# Patient Record
Sex: FEMALE | Race: WHITE | Employment: FULL TIME | URBAN - METROPOLITAN AREA
[De-identification: names, ages, dates, MRNs, and addresses within clinical notes are randomized per-mention and may not be internally consistent; named-entity substitution may affect disease eponyms.]

---

## 2017-02-06 RX ORDER — TRAZODONE HYDROCHLORIDE 100 MG/1
100 TABLET ORAL
Qty: 30 TAB | Refills: 3 | Status: SHIPPED | OUTPATIENT
Start: 2017-02-06 | End: 2017-06-23 | Stop reason: SDUPTHER

## 2017-02-17 ENCOUNTER — OFFICE VISIT (OUTPATIENT)
Dept: FAMILY MEDICINE CLINIC | Age: 52
End: 2017-02-17

## 2017-02-17 VITALS
WEIGHT: 187.2 LBS | HEART RATE: 72 BPM | RESPIRATION RATE: 16 BRPM | BODY MASS INDEX: 29.38 KG/M2 | DIASTOLIC BLOOD PRESSURE: 88 MMHG | SYSTOLIC BLOOD PRESSURE: 118 MMHG | HEIGHT: 67 IN | TEMPERATURE: 97.9 F | OXYGEN SATURATION: 98 %

## 2017-02-17 DIAGNOSIS — H81.10 BPPV (BENIGN PAROXYSMAL POSITIONAL VERTIGO), UNSPECIFIED LATERALITY: ICD-10-CM

## 2017-02-17 DIAGNOSIS — J30.1 NON-SEASONAL ALLERGIC RHINITIS DUE TO POLLEN: ICD-10-CM

## 2017-02-17 DIAGNOSIS — E53.8 VITAMIN B 12 DEFICIENCY: Primary | ICD-10-CM

## 2017-02-17 DIAGNOSIS — R79.89 ABNORMAL TSH: ICD-10-CM

## 2017-02-17 RX ORDER — MONTELUKAST SODIUM 10 MG/1
10 TABLET ORAL
Qty: 30 TAB | Refills: 3 | Status: SHIPPED | OUTPATIENT
Start: 2017-02-17 | End: 2019-05-08

## 2017-02-17 NOTE — PROGRESS NOTES
1. Have you been to the ER, urgent care clinic since your last visit? Hospitalized since your last visit? NO    2. Have you seen or consulted any other health care providers outside of the [address] [place] [address] since your last visit? Include any pap smears or colon screening. NO    3. Would you like to have a Flu Shot Today? NO    4. Vaccines?  No

## 2017-02-17 NOTE — MR AVS SNAPSHOT
Visit Information Date & Time Provider Department Dept. Phone Encounter #  
 2/17/2017  8:40 AM Maria Luz Whitney, [address]  [id number] Follow-up Instructions Return in about 4 weeks (around 3/17/2017), or if symptoms worsen or fail to improve, for follow up allergic rhinitis perrenial and BBPV. Upcoming Health Maintenance Date Due Hepatitis C Screening 1965 DTaP/Tdap/Td series (1 - Tdap) 5/29/1984 PAP AKA CERVICAL CYTOLOGY 5/29/1984 FOBT Q 1 YEAR AGE 50-75 5/29/2013 BREAST CANCER SCRN MAMMOGRAM 3/5/2018 Allergies as of 2/17/2017  Review Complete On: 2/17/2017 By: Maria Luz Whitney NP Severity Noted Reaction Type Reactions Gabapentin  12/01/2016    Other (comments) Fatigued, anhedonia Morphine  01/29/2016    Hives, Itching Current Immunizations  Never Reviewed No immunizations on file. Not reviewed this visit You Were Diagnosed With   
  
 Codes Comments Vitamin B 12 deficiency    -  Primary ICD-10-CM: E53.8 ICD-9-CM: 266.2 Abnormal TSH     ICD-10-CM: R79.89 ICD-9-CM: 790.6 BPPV (benign paroxysmal positional vertigo), unspecified laterality     ICD-10-CM: H81.10 ICD-9-CM: 386.11 Non-seasonal allergic rhinitis due to pollen     ICD-10-CM: J30.1 ICD-9-CM: 477.0 Vitals BP Pulse Temp Resp Height(growth percentile) Weight(growth percentile) 118/88 (BP 1 Location: Left arm, BP Patient Position: Sitting) 72 97.9 °F (36.6 °C) (Oral) 16 5' 7\" (1.702 m) 187 lb 3.2 oz (84.9 kg) SpO2 BMI OB Status Smoking Status 98% 29.32 kg/m2 Postmenopausal Former Smoker BMI and BSA Data Body Mass Index Body Surface Area  
 29.32 kg/m 2 2 m 2 Preferred Pharmacy Pharmacy Name Phone [address], [address] [place] [address] [phone] Your Updated Medication List  
  
   
 This list is accurate as of: 2/17/17  9:31 AM.  Always use your most recent med list.  
  
  
  
  
 cyclobenzaprine 5 mg tablet Commonly known as:  FLEXERIL Take 1 Tab by mouth three (3) times daily (with meals). Indications: FIBROMYALGIA DULoxetine 30 mg capsule Commonly known as:  CYMBALTA 1 tab daily x1 week then 1 tab BID  
  
 montelukast 10 mg tablet Commonly known as:  SINGULAIR Take 1 Tab by mouth nightly. Indications: ALLERGIC RHINITIS  
  
 oxybutynin chloride XL 15 mg CR tablet Commonly known as:  DITROPAN XL Take 1 Tab by mouth daily. traZODone 100 mg tablet Commonly known as:  Oletta Lurdes Take 1 Tab by mouth nightly. Prescriptions Sent to Pharmacy Refills  
 montelukast (SINGULAIR) 10 mg tablet 3 Sig: Take 1 Tab by mouth nightly. Indications: ALLERGIC RHINITIS Class: Normal  
 Pharmacy: [facility] #: [phone] Route: Oral  
  
Follow-up Instructions Return in about 4 weeks (around 3/17/2017), or if symptoms worsen or fail to improve, for follow up allergic rhinitis perrenial and BBPV. To-Do List   
 02/17/2017 Lab:  T4, FREE   
  
 02/17/2017 Lab:  TSH 3RD GENERATION   
  
 02/17/2017 Lab:  VITAMIN B12 Patient Instructions Allergies: Care Instructions Your Care Instructions Allergies occur when your body's defense system (immune system) overreacts to certain substances. The immune system treats a harmless substance as if it were a harmful germ or virus. Many things can cause this overreaction, including pollens, medicine, food, dust, animal dander, and mold. Allergies can be mild or severe. Mild allergies can be managed with home treatment. But medicine may be needed to prevent problems. Managing your allergies is an important part of staying healthy.  Your doctor may suggest that you have allergy testing to help find out what is causing your allergies. When you know what things trigger your symptoms, you can avoid them. This can prevent allergy symptoms and other health problems. For severe allergies that cause reactions that affect your whole body (anaphylactic reactions), your doctor may prescribe a shot of epinephrine to carry with you in case you have a severe reaction. Learn how to give yourself the shot and keep it with you at all times. Make sure it is not . Follow-up care is a key part of your treatment and safety. Be sure to make and go to all appointments, and call your doctor if you are having problems. It's also a good idea to know your test results and keep a list of the medicines you take. How can you care for yourself at home? · If you have been told by your doctor that dust or dust mites are causing your allergy, decrease the dust around your bed: 
[facility] sheets, pillowcases, and other bedding in hot water every week. ¨ Use dust-proof covers for pillows, duvets, and mattresses. Avoid plastic covers because they tear easily and do not \"breathe. \" Wash as instructed on the label. ¨ Do not use any blankets and pillows that you do not need. ¨ Use blankets that you can wash in your washing machine. ¨ Consider removing drapes and carpets, which attract and hold dust, from your bedroom. · If you are allergic to house dust and mites, do not use home humidifiers. Your doctor can suggest ways you can control dust and mites. · Look for signs of cockroaches. Cockroaches cause allergic reactions. Use cockroach baits to get rid of them. Then, clean your home well. Cockroaches like areas where grocery bags, newspapers, empty bottles, or cardboard boxes are stored. Do not keep these inside your home, and keep trash and food containers sealed. Seal off any spots where cockroaches might enter your home. · If you are allergic to mold, get rid of furniture, rugs, and drapes that smell musty. Check for mold in the bathroom. · If you are allergic to outdoor pollen or mold spores, use air-conditioning. Change or clean all filters every month. Keep windows closed. · If you are allergic to pollen, stay inside when pollen counts are high. Use a vacuum  with a HEPA filter or a double-thickness filter at least two times each week. · Stay inside when air pollution is bad. Avoid paint fumes, perfumes, and other strong odors. · Avoid conditions that make your allergies worse. Stay away from smoke. Do not smoke or let anyone else smoke in your house. Do not use fireplaces or wood-burning stoves. · If you are allergic to your pets, change the air filter in your furnace every month. Use high-efficiency filters. · If you are allergic to pet dander, keep pets outside or out of your bedroom. Old carpet and cloth furniture can hold a lot of animal dander. You may need to replace them. When should you call for help? Give an epinephrine shot if: 
· You think you are having a severe allergic reaction. · You have symptoms in more than one body area, such as mild nausea and an itchy mouth. After giving an epinephrine shot call 911, even if you feel better. Call 911 if: 
· You have symptoms of a severe allergic reaction. These may include: 
¨ Sudden raised, red areas (hives) all over your body. ¨ Swelling of the throat, mouth, lips, or tongue. ¨ Trouble breathing. ¨ Passing out (losing consciousness). Or you may feel very lightheaded or suddenly feel weak, confused, or restless. · You have been given an epinephrine shot, even if you feel better. Call your doctor now or seek immediate medical care if: 
· You have symptoms of an allergic reaction, such as: ¨ A rash or hives (raised, red areas on the skin). ¨ Itching. ¨ Swelling. ¨ Belly pain, nausea, or vomiting. Watch closely for changes in your health, and be sure to contact your doctor if: 
· You do not get better as expected. Where can you learn more? Go to http://yuliya-philly.info/. Enter O140 in the search box to learn more about \"Allergies: Care Instructions. \" Current as of: February 12, 2016 Content Version: 11.1 © 4860-0959 [employer]. Care instructions adapted under license by [employer] (which disclaims liability or warranty for this information). If you have questions about a medical condition or this instruction, always ask your healthcare professional. [facility] any warranty or liability for your use of this information. Montelukast (By mouth) Montelukast (odx-ky-QEU-kast) Prevents asthma attacks and treats allergies. Brand Name(s):Singulair There may be other brand names for this medicine. When This Medicine Should Not Be Used: This medicine is not right for everyone. Do not use it if you had an allergic reaction to montelukast. 
How to Use This Medicine:  
Packet, Tablet, Chewable Tablet · Your doctor will tell you how much medicine to use. Do not use more than directed. · Read and follow the patient instructions that come with this medicine. Talk to your doctor or pharmacist if you have any questions. · Oral granules: Do not open the packet until you are ready to use it. You can give the oral granules in one of three ways. ¨ Put the oral granules directly on a spoon, then into the person's mouth. ¨ Mix the granules with baby formula or breast milk that is cold or room temperature. Do not mix the granules with any other liquid. ¨ Mix the granules with applesauce, mashed carrots, rice, or ice cream. Do not mix the granules with any other type of soft food. ¨ If the medicine is mixed with formula, breast milk, or food, use it right away. Do not save any mixed medicine to use later. · Missed dose: Take a dose as soon as you remember. If it is almost time for your next dose, wait until then and take a regular dose.  Do not take extra medicine to make up for a missed dose. · Store the medicine in the original container at room temperature, away from heat and direct light. Drugs and Foods to Avoid: Ask your doctor or pharmacist before using any other medicine, including over-the-counter medicines, vitamins, and herbal products. Warnings While Using This Medicine: · Tell your doctor if you are pregnant or breastfeeding, or if you have liver disease, have a condition called phenylketonuria (PKU), or are allergic to aspirin. · This medicine will not stop an asthma attack that has already started. Your doctor may prescribe another medicine for a sudden asthma attack. · If you use a corticosteroid medicine to control your asthma, keep using it as instructed by your doctor. · If any of your asthma medicines do not seem to be working as well as usual, call your doctor right away. Do not change your doses or stop using your medicines without asking your doctor. · This medicine may cause some people to be agitated, disoriented, irritable, or reckless. It may also cause depression or suicidal thoughts. Tell your doctor if you have any unusual thoughts or behaviors that trouble you. · This medicine may make you dizzy or drowsy. Do not drive or do anything else that could be dangerous until you know how this medicine affects you. · Keep all medicine out of the reach of children. Never share your medicine with anyone. Possible Side Effects While Using This Medicine:  
Call your doctor right away if you notice any of these side effects: · Allergic reaction: Itching or hives, swelling in your face or hands, swelling or tingling in your mouth or throat, chest tightness, trouble breathing · Blistering, peeling, red skin rash · Chest pain, or a fast, pounding, or uneven heartbeat · Numbness or tingling in the hands, arms, legs, or feet · Pain and swelling in your sinuses · Restlessness, anxiety, irritability, mood or behavior changes, or thoughts of hurting yourself or others · Sudden and severe stomach pain, nausea, vomiting, fever, lightheadedness · Unusual bleeding or bruising If you notice these less serious side effects, talk with your doctor: · Cough, sore throat, or flu symptoms · Headache If you notice other side effects that you think are caused by this medicine, tell your doctor. Call your doctor for medical advice about side effects. You may report side effects to FDA at 3-531-FBX-8466 © 2016 [address] is for End User's use only and may not be sold, redistributed or otherwise used for commercial purposes. The above information is an  only. It is not intended as medical advice for individual conditions or treatments. Talk to your doctor, nurse or pharmacist before following any medical regimen to see if it is safe and effective for you. Learning About Leukotriene Modifiers Introduction Leukotriene (say \"loo-koh-TRY-een\") modifiers are drugs used for asthma and hay fever. They treat asthma symptoms in adults. And in children, they improve how well the lungs work. They are not used to treat asthma attacks. They also reduce symptoms of hay fever. These include sneezing and a runny or stuffy nose. Examples · Montelukast (Singulair) · Zafirlukast (Accolate) · Zileuton (Zyflo) Side effects · Vomiting. · Diarrhea. · A headache. You may have other side effects or reactions. Check the information that comes with your medicine. What to know about taking these medicines · These drugs may help if your asthma is caused by exercise, aspirin, or allergies. · You may need to have blood tests during the first 6 months of treatment. The tests check for liver damage. · Take your medicines exactly as prescribed. Call your doctor if you think you are having a problem with your medicine. · Check with your doctor or pharmacist before you use any other medicines. This includes over-the-counter drugs. Make sure your doctor knows all of the medicines you take. This includes vitamins, herbs, and supplements. Taking some drugs together can cause problems. Where can you learn more? Go to http://yuliya-philly.info/. Enter Q022 in the search box to learn more about \"Learning About Leukotriene Modifiers. \" Current as of: May 23, 2016 Content Version: 11.1 © 7332-4459 [employer]APE. Care instructions adapted under license by [employer] (which disclaims liability or warranty for this information). If you have questions about a medical condition or this instruction, always ask your healthcare professional. [address] any warranty or liability for your use of this information. Benign Paroxysmal Positional Vertigo (BPPV): Care Instructions Your Care Instructions Benign paroxysmal positional vertigo, also called BPPV, is an inner ear problem. It causes a spinning or whirling sensation when you move your head. This sensation is called vertigo. The vertigo usually lasts for less than a minute. People often have vertigo spells for a few days or weeks. Then the vertigo goes away. But it may come back again. The vertigo may be mild, or it may be bad enough to cause unsteadiness, nausea, and vomiting. When you move, your inner ear sends messages to the brain. This helps you keep your balance. Vertigo can happen when debris builds up in the inner ear. The buildup can cause the inner ear to send the wrong message to the brain. Your doctor may move you in different positions to help your vertigo get better faster. This is called the Epley maneuver. Your doctor may also prescribe medicines or exercises to help with your symptoms. Follow-up care is a key part of your treatment and safety.  Be sure to make and go to all appointments, and call your doctor if you are having problems. It's also a good idea to know your test results and keep a list of the medicines you take. How can you care for yourself at home? · If your doctor suggests that you do Courtney-Daroff exercises: ¨ Sit on the edge of a bed or sofa. Quickly lie down on the side that causes the worst vertigo. Lie on your side with your ear down. ¨ Stay in this position for at least 30 seconds or until the vertigo goes away. ¨ Sit up. If this causes vertigo, wait for it to stop. ¨ Repeat the procedure on the other side. ¨ Repeat this 10 times. Do these exercises 2 times a day until the vertigo is gone. When should you call for help? Call 911 anytime you think you may need emergency care. For example, call if: 
· You have symptoms of a stroke. These may include: 
¨ Sudden numbness, tingling, weakness, or loss of movement in your face, arm, or leg, especially on only one side of your body. ¨ Sudden vision changes. ¨ Sudden trouble speaking. ¨ Sudden confusion or trouble understanding simple statements. ¨ Sudden problems with walking or balance. ¨ A sudden, severe headache that is different from past headaches. Call your doctor now or seek immediate medical care if: 
· You have new or worse nausea and vomiting. · You have new symptoms such as hearing loss or roaring in your ears. Watch closely for changes in your health, and be sure to contact your doctor if: 
· You are not getting better as expected. · Your vertigo gets worse. Where can you learn more? Go to http://yuliya-philly.info/. Enter  in the search box to learn more about \"Benign Paroxysmal Positional Vertigo (BPPV): Care Instructions. \" Current as of: July 29, 2016 Content Version: 11.1 © 7937-2591 [employer].  Care instructions adapted under license by [employer] (which disclaims liability or warranty for this information). If you have questions about a medical condition or this instruction, always ask your healthcare professional. [address] any warranty or liability for your use of this information. [place]orne Exercises for Vertigo: Care Instructions Your Care Instructions Simple exercises can help you regain your balance when you have vertigo. If you have Ménière's disease, benign paroxysmal positional vertigo (BPPV), or another inner ear problem, you may have vertigo off and on. Do these exercises first thing in the morning and before you go to bed. You might get dizzy when you first start them. If this happens, try to do them for at least 5 minutes. Do a group of exercises at a time, starting at the top of the list. It may take several weeks before you can do all the exercises without feeling dizzy. Follow-up care is a key part of your treatment and safety. Be sure to make and go to all appointments, and call your doctor if you are having problems. It's also a good idea to know your test results and keep a list of the medicines you take. How can you care for yourself at home? Exercise 1 While sitting on the side of the bed and holding your head still: 
· Look up as far as you can. · Look down as far as you can. · Look from side to side as far as you can. · Stretch your arm straight out in front of you. Focus on your index finger. Continue to focus on your finger while you bring it to your nose. Exercise 2 While sitting on the side of the bed: · Bring your head as far back as you can. · Bring your head forward to touch your chin to your chest. 
· Turn your head from side to side. · Do these exercises first with your eyes open. Then try with your eyes closed. Exercise 3 While sitting on the side of the bed: · Shrug your shoulders straight upward, then relax them. · Bend over and try to touch the ground with your fingers. Then go back to a sitting position. · Toss a small ball from one hand to the other. Throw the ball higher than your eyes so you have to look up. Exercise 4 While standing (with someone close by if you feel uncomfortable): 
· Repeat Exercise 1. 
· Repeat Exercise 2. 
· Pass a ball between your legs and above your head. · Sit down and then stand up. Repeat. Turn around in a [place] a different way each time you stand. · With someone close by to help you, try the above exercises with your eyes closed. Exercise 5 In a room that is cleared of obstacles: 
· Walk to a corner of the room, turn to your right, and walk back to the starting point. Now, repeat and turn left. · Walk up and down a slope. Now try stairs. · While holding on to someone's arm, try these exercises with your eyes closed. When should you call for help? Watch closely for changes in your health, and be sure to contact your doctor if: 
· Your vertigo gets worse. · You want more information about vertigo. · You want more information about exercises for vertigo. Where can you learn more? Go to http://yuliya-philly.info/. Enter C687 in the search box to learn more about \"Cawthorne Exercises for Vertigo: Care Instructions. \" Current as of: July 29, 2016 Content Version: 11.1 © 0335-8471 [employer]wise[employer]orporated. Care instructions adapted under license by [employer] (which disclaims liability or warranty for this information). If you have questions about a medical condition or this instruction, always ask your healthcare professional. [place] [address] any warranty or liability for your use of this information. Introducing [facility]! [address] introduces [email or web address] patient portal. Now you can access parts of your medical record, email your doctor's office, and request medication refills online. 1. In your internet browser, go to [email or web address] 2. Click on the First Time User? Click Here link in the Sign In box. You will see the New Member Sign Up page. 3. Enter your [employer] Access Code exactly as it appears below. You will not need to use this code after youve completed the sign-up process. If you do not sign up before the expiration date, you must request a new code. · [employer] Access Code: [email or web address] Expires: 5/18/2017  9:31 AM 
 
4. Enter the last four digits of your Social Security Number (xxxx) and Date of Birth (mm/dd/yyyy) as indicated and click Submit. You will be taken to the next sign-up page. 5. Create a E[employer] ID. This will be your [employer] login ID and cannot be changed, so think of one that is secure and easy to remember. 6. Create a E[employer] password. You can change your password at any time. 7. Enter your Password Reset Question and Answer. This can be used at a later time if you forget your password. 8. Enter your e-mail address. You will receive e-mail notification when new information is available in [address] 9. Click Sign Up. You can now view and download portions of your medical record. 10. Click the Download Summary menu link to download a portable copy of your medical information. If you have questions, please visit the Frequently Asked Questions section of the E[employer]town website. Remember, [employer] is NOT to be used for urgent needs. For medical emergencies, dial 911. Now available from your iPhone and Android! Please provide this summary of care documentation to your next provider. Your primary care clinician is listed as [address]. If you have any questions after today's visit, please call [phone].

## 2017-02-17 NOTE — PROGRESS NOTES
Subjective:   Evens Glez is a 46 y.o. female follows up today for vitamin b 12 low normal.  Has been taking vitamin b 12 1000 mcg daily for past one month. Reviewed labs with patient. She is also complaining of intermittent lightheadedness, headaches 3-4 times per week across the frontal and maxillary, +sinus issues with weather changes. She reports that she awakens with these headaches. ROS: denies migraines, +use nettipot, denies use of antihistamines, denies tingling in hands and feet, denies heart palpitations, denies falls, denies syncope, denies ear congestion, tinnitus. Wt Readings from Last 3 Encounters:   02/17/17 187 lb 3.2 oz (84.9 kg)   12/01/16 189 lb 9.6 oz (86 kg)   11/03/16 189 lb 3.2 oz (85.8 kg)     BP Readings from Last 3 Encounters:   02/17/17 118/88   12/01/16 110/84   11/03/16 118/80     OBJECTIVE:  Awake and alert in no acute distress  HEENT: nares patent with erythema, boggy, clear secretions bilaterally. TMs retracted bilaterally, pharynx without erythema, neck supple with shotty lymphadenopathy. No tenderness to palpation over sinus passages bilaterally  Lungs clear throughout  S1 S2 RRR without ectopy or murmur auscultated. Neuro: cranial nerves II-XII tested and intact. No gait abnormalities. +barany procedure  Visit Vitals    /88 (BP 1 Location: Left arm, BP Patient Position: Sitting)    Pulse 72    Temp 97.9 °F (36.6 °C) (Oral)    Resp 16    Ht 5' 7\" (1.702 m)    Wt 187 lb 3.2 oz (84.9 kg)    SpO2 98%    BMI 29.32 kg/m2     [facility] was seen today for other. Diagnoses and all orders for this visit:    Vitamin B 12 deficiency  -     VITAMIN B12; Future    Abnormal TSH  -     TSH 3RD GENERATION; Future  -     T4, FREE; Future    BPPV (benign paroxysmal positional vertigo), unspecified laterality    Non-seasonal allergic rhinitis due to pollen  -     montelukast (SINGULAIR) 10 mg tablet; Take 1 Tab by mouth nightly.  Indications: ALLERGIC RHINITIS    Reviewed risks and benefits and common side effects of new medication  General comfort measures  Reviewed exercises for BPPV  Health maintenance reviewed  Portion control and exercise healthy eating general guidelines reviewed with patient to get closer to normal BMI  Patient verbalizes understanding. I have discussed the diagnosis with the patient and the intended plan as seen in the above orders. The patient has received an after-visit summary and questions were answered concerning future plans. I have discussed medication side effects and warnings with the patient as well. Follow-up Disposition:  Return in about 4 weeks (around 3/17/2017), or if symptoms worsen or fail to improve, for follow up allergic rhinitis perrenial and BBPV.

## 2017-02-17 NOTE — PATIENT INSTRUCTIONS
Allergies: Care Instructions  Your Care Instructions  Allergies occur when your body's defense system (immune system) overreacts to certain substances. The immune system treats a harmless substance as if it were a harmful germ or virus. Many things can cause this overreaction, including pollens, medicine, food, dust, animal dander, and mold. Allergies can be mild or severe. Mild allergies can be managed with home treatment. But medicine may be needed to prevent problems. Managing your allergies is an important part of staying healthy. Your doctor may suggest that you have allergy testing to help find out what is causing your allergies. When you know what things trigger your symptoms, you can avoid them. This can prevent allergy symptoms and other health problems. For severe allergies that cause reactions that affect your whole body (anaphylactic reactions), your doctor may prescribe a shot of epinephrine to carry with you in case you have a severe reaction. Learn how to give yourself the shot and keep it with you at all times. Make sure it is not . Follow-up care is a key part of your treatment and safety. Be sure to make and go to all appointments, and call your doctor if you are having problems. It's also a good idea to know your test results and keep a list of the medicines you take. How can you care for yourself at home? · If you have been told by your doctor that dust or dust mites are causing your allergy, decrease the dust around your bed:  [facility] sheets, pillowcases, and other bedding in hot water every week. ¨ Use dust-proof covers for pillows, duvets, and mattresses. Avoid plastic covers because they tear easily and do not \"breathe. \" Wash as instructed on the label. ¨ Do not use any blankets and pillows that you do not need. ¨ Use blankets that you can wash in your washing machine. ¨ Consider removing drapes and carpets, which attract and hold dust, from your bedroom.   · If you are allergic to house dust and mites, do not use home humidifiers. Your doctor can suggest ways you can control dust and mites. · Look for signs of cockroaches. Cockroaches cause allergic reactions. Use cockroach baits to get rid of them. Then, clean your home well. Cockroaches like areas where grocery bags, newspapers, empty bottles, or cardboard boxes are stored. Do not keep these inside your home, and keep trash and food containers sealed. Seal off any spots where cockroaches might enter your home. · If you are allergic to mold, get rid of furniture, rugs, and drapes that smell musty. Check for mold in the bathroom. · If you are allergic to outdoor pollen or mold spores, use air-conditioning. Change or clean all filters every month. Keep windows closed. · If you are allergic to pollen, stay inside when pollen counts are high. Use a vacuum  with a HEPA filter or a double-thickness filter at least two times each week. · Stay inside when air pollution is bad. Avoid paint fumes, perfumes, and other strong odors. · Avoid conditions that make your allergies worse. Stay away from smoke. Do not smoke or let anyone else smoke in your house. Do not use fireplaces or wood-burning stoves. · If you are allergic to your pets, change the air filter in your furnace every month. Use high-efficiency filters. · If you are allergic to pet dander, keep pets outside or out of your bedroom. Old carpet and cloth furniture can hold a lot of animal dander. You may need to replace them. When should you call for help? Give an epinephrine shot if:  · You think you are having a severe allergic reaction. · You have symptoms in more than one body area, such as mild nausea and an itchy mouth. After giving an epinephrine shot call 911, even if you feel better. Call 911 if:  · You have symptoms of a severe allergic reaction. These may include:  ¨ Sudden raised, red areas (hives) all over your body.   ¨ Swelling of the throat, mouth, lips, or tongue. ¨ Trouble breathing. ¨ Passing out (losing consciousness). Or you may feel very lightheaded or suddenly feel weak, confused, or restless. · You have been given an epinephrine shot, even if you feel better. Call your doctor now or seek immediate medical care if:  · You have symptoms of an allergic reaction, such as:  ¨ A rash or hives (raised, red areas on the skin). ¨ Itching. ¨ Swelling. ¨ Belly pain, nausea, or vomiting. Watch closely for changes in your health, and be sure to contact your doctor if:  · You do not get better as expected. Where can you learn more? Go to http://yuliya-philly.info/. Enter Y508 in the search box to learn more about \"Allergies: Care Instructions. \"  Current as of: February 12, 2016  Content Version: 11.1  © 1819-8145 [employer]. Care instructions adapted under license by [employer] (which disclaims liability or warranty for this information). If you have questions about a medical condition or this instruction, always ask your healthcare professional. [address] any warranty or liability for your use of this information. Montelukast (By mouth)   Montelukast ([id number])  Prevents asthma attacks and treats allergies. Brand Name(s):Singulair   There may be other brand names for this medicine. When This Medicine Should Not Be Used: This medicine is not right for everyone. Do not use it if you had an allergic reaction to montelukast.  How to Use This Medicine:   Packet, Tablet, Chewable Tablet  · Your doctor will tell you how much medicine to use. Do not use more than directed. · Read and follow the patient instructions that come with this medicine. Talk to your doctor or pharmacist if you have any questions. · Oral granules: Do not open the packet until you are ready to use it. You can give the oral granules in one of three ways.   ¨ Put the oral granules directly on a spoon, then into the person's mouth. ¨ Mix the granules with baby formula or breast milk that is cold or room temperature. Do not mix the granules with any other liquid. ¨ Mix the granules with applesauce, mashed carrots, rice, or ice cream. Do not mix the granules with any other type of soft food. ¨ If the medicine is mixed with formula, breast milk, or food, use it right away. Do not save any mixed medicine to use later. · Missed dose: Take a dose as soon as you remember. If it is almost time for your next dose, wait until then and take a regular dose. Do not take extra medicine to make up for a missed dose. · Store the medicine in the original container at room temperature, away from heat and direct light. Drugs and Foods to Avoid:      Ask your doctor or pharmacist before using any other medicine, including over-the-counter medicines, vitamins, and herbal products. Warnings While Using This Medicine:   · Tell your doctor if you are pregnant or breastfeeding, or if you have liver disease, have a condition called phenylketonuria (PKU), or are allergic to aspirin. · This medicine will not stop an asthma attack that has already started. Your doctor may prescribe another medicine for a sudden asthma attack. · If you use a corticosteroid medicine to control your asthma, keep using it as instructed by your doctor. · If any of your asthma medicines do not seem to be working as well as usual, call your doctor right away. Do not change your doses or stop using your medicines without asking your doctor. · This medicine may cause some people to be agitated, disoriented, irritable, or reckless. It may also cause depression or suicidal thoughts. Tell your doctor if you have any unusual thoughts or behaviors that trouble you. · This medicine may make you dizzy or drowsy. Do not drive or do anything else that could be dangerous until you know how this medicine affects you. · Keep all medicine out of the reach of children. Never share your medicine with anyone. Possible Side Effects While Using This Medicine:   Call your doctor right away if you notice any of these side effects:  · Allergic reaction: Itching or hives, swelling in your face or hands, swelling or tingling in your mouth or throat, chest tightness, trouble breathing  · Blistering, peeling, red skin rash  · Chest pain, or a fast, pounding, or uneven heartbeat  · Numbness or tingling in the hands, arms, legs, or feet  · Pain and swelling in your sinuses  · Restlessness, anxiety, irritability, mood or behavior changes, or thoughts of hurting yourself or others  · Sudden and severe stomach pain, nausea, vomiting, fever, lightheadedness  · Unusual bleeding or bruising  If you notice these less serious side effects, talk with your doctor:   · Cough, sore throat, or flu symptoms  · Headache  If you notice other side effects that you think are caused by this medicine, tell your doctor. Call your doctor for medical advice about side effects. You may report side effects to FDA at 5-981-FDA-1687  © 2016 [address] is for End User's use only and may not be sold, redistributed or otherwise used for commercial purposes. The above information is an  only. It is not intended as medical advice for individual conditions or treatments. Talk to your doctor, nurse or pharmacist before following any medical regimen to see if it is safe and effective for you. Learning About Leukotriene Modifiers  Introduction  Leukotriene (say \"loo-koh-TRY-een\") modifiers are drugs used for asthma and hay fever. They treat asthma symptoms in adults. And in children, they improve how well the lungs work. They are not used to treat asthma attacks. They also reduce symptoms of hay fever. These include sneezing and a runny or stuffy nose.   Examples  · Montelukast (Singulair)  · Zafirlukast (Accolate)  · Zileuton (Zyflo)  Side effects  · Vomiting. · Diarrhea. · A headache. You may have other side effects or reactions. Check the information that comes with your medicine. What to know about taking these medicines  · These drugs may help if your asthma is caused by exercise, aspirin, or allergies. · You may need to have blood tests during the first 6 months of treatment. The tests check for liver damage. · Take your medicines exactly as prescribed. Call your doctor if you think you are having a problem with your medicine. · Check with your doctor or pharmacist before you use any other medicines. This includes over-the-counter drugs. Make sure your doctor knows all of the medicines you take. This includes vitamins, herbs, and supplements. Taking some drugs together can cause problems. Where can you learn more? Go to http://yuliya-philly.info/. Enter Z101 in the search box to learn more about \"Learning About Leukotriene Modifiers. \"  Current as of: May 23, 2016  Content Version: 11.1  © 3429-8283 [employer]. Care instructions adapted under license by [employer] (which disclaims liability or warranty for this information). If you have questions about a medical condition or this instruction, always ask your healthcare professional. [place] [address] any warranty or liability for your use of this information. Benign Paroxysmal Positional Vertigo (BPPV): Care Instructions  Your Care Instructions    Benign paroxysmal positional vertigo, also called BPPV, is an inner ear problem. It causes a spinning or whirling sensation when you move your head. This sensation is called vertigo. The vertigo usually lasts for less than a minute. People often have vertigo spells for a few days or weeks. Then the vertigo goes away. But it may come back again. The vertigo may be mild, or it may be bad enough to cause unsteadiness, nausea, and vomiting.   When you move, your inner ear sends messages to the brain. This helps you keep your balance. Vertigo can happen when debris builds up in the inner ear. The buildup can cause the inner ear to send the wrong message to the brain. Your doctor may move you in different positions to help your vertigo get better faster. This is called the Epley maneuver. Your doctor may also prescribe medicines or exercises to help with your symptoms. Follow-up care is a key part of your treatment and safety. Be sure to make and go to all appointments, and call your doctor if you are having problems. It's also a good idea to know your test results and keep a list of the medicines you take. How can you care for yourself at home? · If your doctor suggests that you do Courtney-Daroff exercises:  ¨ Sit on the edge of a bed or sofa. Quickly lie down on the side that causes the worst vertigo. Lie on your side with your ear down. ¨ Stay in this position for at least 30 seconds or until the vertigo goes away. ¨ Sit up. If this causes vertigo, wait for it to stop. ¨ Repeat the procedure on the other side. ¨ Repeat this 10 times. Do these exercises 2 times a day until the vertigo is gone. When should you call for help? Call 911 anytime you think you may need emergency care. For example, call if:  · You have symptoms of a stroke. These may include:  ¨ Sudden numbness, tingling, weakness, or loss of movement in your face, arm, or leg, especially on only one side of your body. ¨ Sudden vision changes. ¨ Sudden trouble speaking. ¨ Sudden confusion or trouble understanding simple statements. ¨ Sudden problems with walking or balance. ¨ A sudden, severe headache that is different from past headaches. Call your doctor now or seek immediate medical care if:  · You have new or worse nausea and vomiting. · You have new symptoms such as hearing loss or roaring in your ears.   Watch closely for changes in your health, and be sure to contact your doctor if:  · You are not getting better as expected. · Your vertigo gets worse. Where can you learn more? Go to http://yuliya-philly.info/. Enter  in the search box to learn more about \"Benign Paroxysmal Positional Vertigo (BPPV): Care Instructions. \"  Current as of: July 29, 2016  Content Version: 11.1  © 2006-2016 [employer]. Care instructions adapted under license by [employer] (which disclaims liability or warranty for this information). If you have questions about a medical condition or this instruction, always ask your healthcare professional. [address] any warranty or liability for your use of this information. Cawthorne Exercises for Vertigo: Care Instructions  Your Care Instructions  Simple exercises can help you regain your balance when you have vertigo. If you have Ménière's disease, benign paroxysmal positional vertigo (BPPV), or another inner ear problem, you may have vertigo off and on. Do these exercises first thing in the morning and before you go to bed. You might get dizzy when you first start them. If this happens, try to do them for at least 5 minutes. Do a group of exercises at a time, starting at the top of the list. It may take several weeks before you can do all the exercises without feeling dizzy. Follow-up care is a key part of your treatment and safety. Be sure to make and go to all appointments, and call your doctor if you are having problems. It's also a good idea to know your test results and keep a list of the medicines you take. How can you care for yourself at home? Exercise 1  While sitting on the side of the bed and holding your head still:  · Look up as far as you can. · Look down as far as you can. · Look from side to side as far as you can. · Stretch your arm straight out in front of you. Focus on your index finger. Continue to focus on your finger while you bring it to your nose.   Exercise 2  While sitting on the side of the bed:  · Bring your head as far back as you can. · Bring your head forward to touch your chin to your chest.  · Turn your head from side to side. · Do these exercises first with your eyes open. Then try with your eyes closed. Exercise 3  While sitting on the side of the bed:  · Shrug your shoulders straight upward, then relax them. · Bend over and try to touch the ground with your fingers. Then go back to a sitting position. · Toss a small ball from one hand to the other. Throw the ball higher than your eyes so you have to look up. Exercise 4  While standing (with someone close by if you feel uncomfortable):  · Repeat Exercise 1.  · Repeat Exercise 2.  · Pass a ball between your legs and above your head. · Sit down and then stand up. Repeat. Turn around in a [place] a different way each time you stand. · With someone close by to help you, try the above exercises with your eyes closed. Exercise 5  In a room that is cleared of obstacles:  · Walk to a corner of the room, turn to your right, and walk back to the starting point. Now, repeat and turn left. · Walk up and down a slope. Now try stairs. · While holding on to someone's arm, try these exercises with your eyes closed. When should you call for help? Watch closely for changes in your health, and be sure to contact your doctor if:  · Your vertigo gets worse. · You want more information about vertigo. · You want more information about exercises for vertigo. Where can you learn more? Go to http://yuliya-philly.info/. Enter X359 in the search box to learn more about \"Cawthorne Exercises for Vertigo: Care Instructions. \"  Current as of: July 29, 2016  Content Version: 11.1  © 9101-6833 [employer]. Care instructions adapted under license by [employer] (which disclaims liability or warranty for this information).  If you have questions about a medical condition or this instruction, always ask your healthcare professional. [address] any warranty or liability for your use of this information.

## 2017-03-17 ENCOUNTER — TELEPHONE (OUTPATIENT)
Dept: FAMILY MEDICINE CLINIC | Age: 52
End: 2017-03-17

## 2017-03-17 ENCOUNTER — OFFICE VISIT (OUTPATIENT)
Dept: FAMILY MEDICINE CLINIC | Age: 52
End: 2017-03-17

## 2017-03-17 VITALS
OXYGEN SATURATION: 97 % | TEMPERATURE: 98.1 F | RESPIRATION RATE: 16 BRPM | BODY MASS INDEX: 29.03 KG/M2 | SYSTOLIC BLOOD PRESSURE: 116 MMHG | DIASTOLIC BLOOD PRESSURE: 82 MMHG | HEIGHT: 67 IN | HEART RATE: 70 BPM | WEIGHT: 185 LBS

## 2017-03-17 DIAGNOSIS — J30.9 ALLERGIC RHINITIS, UNSPECIFIED ALLERGIC RHINITIS TRIGGER, UNSPECIFIED RHINITIS SEASONALITY: Primary | ICD-10-CM

## 2017-03-17 NOTE — MR AVS SNAPSHOT
Visit Information Date & Time Provider Department Dept. Phone Encounter #  
 3/17/2017  7:00 AM Leona Cordova, [facility] [id number] Follow-up Instructions Return if symptoms worsen or fail to improve. Upcoming Health Maintenance Date Due Hepatitis C Screening 1965 DTaP/Tdap/Td series (1 - Tdap) 5/29/1984 PAP AKA CERVICAL CYTOLOGY 5/29/1984 FOBT Q 1 YEAR AGE 50-75 5/29/2013 BREAST CANCER SCRN MAMMOGRAM 3/5/2018 Allergies as of 3/17/2017  Review Complete On: 3/17/2017 By: Ne Eric LPN Severity Noted Reaction Type Reactions Gabapentin  12/01/2016    Other (comments) Fatigued, anhedonia Morphine  01/29/2016    Hives, Itching Current Immunizations  Never Reviewed No immunizations on file. Not reviewed this visit You Were Diagnosed With   
  
 Codes Comments Allergic rhinitis, unspecified allergic rhinitis trigger, unspecified rhinitis seasonality    -  Primary ICD-10-CM: J30.9 ICD-9-CM: 477.9 Vitals BP Pulse Temp Resp Height(growth percentile) Weight(growth percentile) 116/82 (BP 1 Location: Left arm, BP Patient Position: Sitting) 70 98.1 °F (36.7 °C) (Oral) 16 5' 7\" (1.702 m) 185 lb (83.9 kg) SpO2 BMI OB Status Smoking Status 97% 28.98 kg/m2 Postmenopausal Former Smoker BMI and BSA Data Body Mass Index Body Surface Area  
 28.98 kg/m 2 1.99 m 2 Preferred Pharmacy Pharmacy Name Phone [address], [address] [place] [address] [phone] Your Updated Medication List  
  
   
This list is accurate as of: 3/17/17  7:41 AM.  Always use your most recent med list.  
  
  
  
  
 cyclobenzaprine 5 mg tablet Commonly known as:  FLEXERIL Take 1 Tab by mouth three (3) times daily (with meals). Indications: FIBROMYALGIA DULoxetine 30 mg capsule Commonly known as:  CYMBALTA 1 tab daily x1 week then 1 tab BID  
  
 montelukast 10 mg tablet Commonly known as:  SINGULAIR Take 1 Tab by mouth nightly. Indications: ALLERGIC RHINITIS  
  
 oxybutynin chloride XL 15 mg CR tablet Commonly known as:  DITROPAN XL Take 1 Tab by mouth daily. traZODone 100 mg tablet Commonly known as:  Dakota Handy Take 1 Tab by mouth nightly. Follow-up Instructions Return if symptoms worsen or fail to improve. Patient Instructions Allergies: Care Instructions Your Care Instructions Allergies occur when your body's defense system (immune system) overreacts to certain substances. The immune system treats a harmless substance as if it were a harmful germ or virus. Many things can cause this overreaction, including pollens, medicine, food, dust, animal dander, and mold. Allergies can be mild or severe. Mild allergies can be managed with home treatment. But medicine may be needed to prevent problems. Managing your allergies is an important part of staying healthy. Your doctor may suggest that you have allergy testing to help find out what is causing your allergies. When you know what things trigger your symptoms, you can avoid them. This can prevent allergy symptoms and other health problems. For severe allergies that cause reactions that affect your whole body (anaphylactic reactions), your doctor may prescribe a shot of epinephrine to carry with you in case you have a severe reaction. Learn how to give yourself the shot and keep it with you at all times. Make sure it is not . Follow-up care is a key part of your treatment and safety. Be sure to make and go to all appointments, and call your doctor if you are having problems. It's also a good idea to know your test results and keep a list of the medicines you take. How can you care for yourself at home?  
· If you have been told by your doctor that dust or dust mites are causing your allergy, decrease the dust around your bed: 
[facility] [employer] sheets, pillowcases, and other bedding in hot water every week. ¨ Use dust-proof covers for pillows, duvets, and mattresses. Avoid plastic covers because they tear easily and do not \"breathe. \" Wash as instructed on the label. ¨ Do not use any blankets and pillows that you do not need. ¨ Use blankets that you can wash in your washing machine. ¨ Consider removing drapes and carpets, which attract and hold dust, from your bedroom. · If you are allergic to house dust and mites, do not use home humidifiers. Your doctor can suggest ways you can control dust and mites. · Look for signs of cockroaches. Cockroaches cause allergic reactions. Use cockroach baits to get rid of them. Then, clean your home well. Cockroaches like areas where grocery bags, newspapers, empty bottles, or cardboard boxes are stored. Do not keep these inside your home, and keep trash and food containers sealed. Seal off any spots where cockroaches might enter your home. · If you are allergic to mold, get rid of furniture, rugs, and drapes that smell musty. Check for mold in the bathroom. · If you are allergic to outdoor pollen or mold spores, use air-conditioning. Change or clean all filters every month. Keep windows closed. · If you are allergic to pollen, stay inside when pollen counts are high. Use a vacuum  with a HEPA filter or a double-thickness filter at least two times each week. · Stay inside when air pollution is bad. Avoid paint fumes, perfumes, and other strong odors. · Avoid conditions that make your allergies worse. Stay away from smoke. Do not smoke or let anyone else smoke in your house. Do not use fireplaces or wood-burning stoves. · If you are allergic to your pets, change the air filter in your furnace every month. Use high-efficiency filters.  
· If you are allergic to pet dander, keep pets outside or out of your bedroom. Old carpet and cloth furniture can hold a lot of animal dander. You may need to replace them. When should you call for help? Give an epinephrine shot if: 
· You think you are having a severe allergic reaction. · You have symptoms in more than one body area, such as mild nausea and an itchy mouth. After giving an epinephrine shot call 911, even if you feel better. Call 911 if: 
· You have symptoms of a severe allergic reaction. These may include: 
¨ Sudden raised, red areas (hives) all over your body. ¨ Swelling of the throat, mouth, lips, or tongue. ¨ Trouble breathing. ¨ Passing out (losing consciousness). Or you may feel very lightheaded or suddenly feel weak, confused, or restless. · You have been given an epinephrine shot, even if you feel better. Call your doctor now or seek immediate medical care if: 
· You have symptoms of an allergic reaction, such as: ¨ A rash or hives (raised, red areas on the skin). ¨ Itching. ¨ Swelling. ¨ Belly pain, nausea, or vomiting. Watch closely for changes in your health, and be sure to contact your doctor if: 
· You do not get better as expected. Where can you learn more? Go to http://yuliya-philly.info/. Enter D755 in the search box to learn more about \"Allergies: Care Instructions. \" Current as of: February 12, 2016 Content Version: 11.1 © 6279-2490 [employer]. Care instructions adapted under license by [employer] (which disclaims liability or warranty for this information). If you have questions about a medical condition or this instruction, always ask your healthcare professional. [address] any warranty or liability for your use of this information. Introducing [facility]! Radha Toribio introduces [employer] patient portal. Now you can access parts of your medical record, email your doctor's office, and request medication refills online. 1. In your internet browser, go to [email or web address] 2. Click on the First Time User? Click Here link in the Sign In box. You will see the New Member Sign Up page. 3. Enter your Play[employer] Access Code exactly as it appears below. You will not need to use this code after youve completed the sign-up process. If you do not sign up before the expiration date, you must request a new code. · Play[employer] Access Code: [email or web address] Expires: 5/18/2017 10:31 AM 
 
4. Enter the last four digits of your Social Security Number (xxxx) and Date of Birth (mm/dd/yyyy) as indicated and click Submit. You will be taken to the next sign-up page. 5. Create a Plan A Drinkt ID. This will be your Playboox login ID and cannot be changed, so think of one that is secure and easy to remember. 6. Create a Play[employer]ox password. You can change your password at any time. 7. Enter your Password Reset Question and Answer. This can be used at a later time if you forget your password. 8. Enter your e-mail address. You will receive e-mail notification when new information is available in [address] 9. Click Sign Up. You can now view and download portions of your medical record. 10. Click the Download Summary menu link to download a portable copy of your medical information. If you have questions, please visit the Frequently Asked Questions section of the Play[employer] website. Remember, Play[employer] is NOT to be used for urgent needs. For medical emergencies, dial 911. Now available from your iPhone and Android! Please provide this summary of care documentation to your next provider. Your primary care clinician is listed as [address]. If you have any questions after today's visit, please call [phone].

## 2017-03-17 NOTE — PROGRESS NOTES
1. Have you been to the ER, urgent care clinic since your last visit? Hospitalized since your last visit? NO    2. Have you seen or consulted any other health care providers outside of the [address] [place] [address] since your last visit? Include any pap smears or colon screening. NO    3. Would you like to have a Flu Shot Today? No    4. Vaccines?  No      Rx updated

## 2017-03-17 NOTE — PROGRESS NOTES
Subjective:   Katherin Taylor is a 46 y.o. female follow up allergic rhinitis, dark circles under eyes despite use of antihistamines over the counter and Singulair nightly. She is also here today for review of most recent lab work which was not available. Faxed labs from [facility] will be called to her when available. Patient verbalizes understanding. ROS: denies sinus pressure headache, ear pain, cough  Patient Active Problem List   Diagnosis Code    Calculus of kidney N20.0    Fibromyalgia M79.7    Essential hypertension I10     Current Outpatient Prescriptions on File Prior to Visit   Medication Sig Dispense Refill    montelukast (SINGULAIR) 10 mg tablet Take 1 Tab by mouth nightly. Indications: ALLERGIC RHINITIS 30 Tab 3    traZODone (DESYREL) 100 mg tablet Take 1 Tab by mouth nightly. 30 Tab 3    cyclobenzaprine (FLEXERIL) 5 mg tablet Take 1 Tab by mouth three (3) times daily (with meals). Indications: FIBROMYALGIA 90 Tab 3    oxybutynin chloride XL (DITROPAN XL) 15 mg CR tablet Take 1 Tab by mouth daily. 90 Tab 6    DULoxetine (CYMBALTA) 30 mg capsule 1 tab daily x1 week then 1 tab BID 60 Cap 1     No current facility-administered medications on file prior to visit. PMH: reviewed medications and allergy lists and medical and family history. OBJECTIVE:  Awake and alert in no acute distress  HEENT: nares patent with erythema, boggy, clear secretions bilaterally. TMs retracted bilaterally, pharynx without erythema, neck supple with shotty lymphadenopathy. No tenderness to palpation over sinus passages bilaterally  Lungs clear throughout  S1 S2 RRR without ectopy or murmur auscultated. Visit Vitals    /82 (BP 1 Location: Left arm, BP Patient Position: Sitting)    Pulse 70    Temp 98.1 °F (36.7 °C) (Oral)    Resp 16    Ht 5' 7\" (1.702 m)    Wt 185 lb (83.9 kg)    SpO2 97%    BMI 28.98 kg/m2     Adelaide Alicia was seen today for follow-up and medication evaluation.     Diagnoses and all orders for this visit:    Allergic rhinitis, unspecified allergic rhinitis trigger, unspecified rhinitis seasonality    advised use of nasacort or flonase over the counter with antihistamine. Evaluate efficacy and notify provider symptoms not resolved consider referral to ENT  Portion control and exercise healthy eating general guidelines reviewed with patient to get closer to normal BMI  Patient verbalizes understanding. I have discussed the diagnosis with the patient and the intended plan as seen in the above orders. The patient has received an after-visit summary and questions were answered concerning future plans. I have discussed medication side effects and warnings with the patient as well. Follow-up Disposition:  Return if symptoms worsen or fail to improve.

## 2017-03-17 NOTE — PATIENT INSTRUCTIONS

## 2017-06-23 RX ORDER — TRAZODONE HYDROCHLORIDE 100 MG/1
100 TABLET ORAL
Qty: 30 TAB | Refills: 3 | Status: SHIPPED | OUTPATIENT
Start: 2017-06-23 | End: 2017-11-10 | Stop reason: SDUPTHER

## 2017-08-19 ENCOUNTER — HOSPITAL ENCOUNTER (OUTPATIENT)
Dept: MAMMOGRAPHY | Age: 52
Discharge: HOME | End: 2017-08-19
Payer: COMMERCIAL

## 2017-08-19 DIAGNOSIS — Z12.31 VISIT FOR SCREENING MAMMOGRAM: ICD-10-CM

## 2017-08-19 PROCEDURE — 77067 SCR MAMMO BI INCL CAD: CPT

## 2017-11-10 RX ORDER — TRAZODONE HYDROCHLORIDE 100 MG/1
100 TABLET ORAL
Qty: 30 TAB | Refills: 3 | Status: SHIPPED | OUTPATIENT
Start: 2017-11-10

## 2017-11-10 NOTE — TELEPHONE ENCOUNTER
Requested Prescriptions     Pending Prescriptions Disp Refills    traZODone (DESYREL) 100 mg tablet 30 Tab 3     Sig: Take 1 Tab by mouth nightly.

## 2018-12-08 ENCOUNTER — HOSPITAL ENCOUNTER (OUTPATIENT)
Dept: MAMMOGRAPHY | Age: 53
Discharge: HOME | End: 2018-12-08
Attending: OBSTETRICS & GYNECOLOGY
Payer: COMMERCIAL

## 2018-12-08 DIAGNOSIS — Z12.31 VISIT FOR SCREENING MAMMOGRAM: ICD-10-CM

## 2018-12-08 PROCEDURE — 77063 BREAST TOMOSYNTHESIS BI: CPT

## 2019-01-24 ENCOUNTER — HOSPITAL ENCOUNTER (OUTPATIENT)
Dept: LAB | Age: 54
Discharge: HOME | End: 2019-01-24
Payer: COMMERCIAL

## 2019-01-24 ENCOUNTER — HOSPITAL ENCOUNTER (OUTPATIENT)
Dept: GENERAL RADIOLOGY | Age: 54
Discharge: HOME | End: 2019-01-24
Payer: COMMERCIAL

## 2019-01-24 DIAGNOSIS — Z01.810 PRE-OPERATIVE CARDIOVASCULAR EXAMINATION: ICD-10-CM

## 2019-01-24 DIAGNOSIS — Z01.811 ENCOUNTER FOR PREOPERATIVE PULMONARY EXAMINATION: ICD-10-CM

## 2019-01-24 LAB
ATRIAL RATE: 69 BPM
CALCULATED P AXIS, ECG09: 36 DEGREES
CALCULATED R AXIS, ECG10: -23 DEGREES
CALCULATED T AXIS, ECG11: -23 DEGREES
DIAGNOSIS, 93000: NORMAL
P-R INTERVAL, ECG05: 136 MS
Q-T INTERVAL, ECG07: 404 MS
QRS DURATION, ECG06: 88 MS
QTC CALCULATION (BEZET), ECG08: 432 MS
SENTARA SPECIMEN COL,SENBCF: NORMAL
VENTRICULAR RATE, ECG03: 69 BPM

## 2019-01-24 PROCEDURE — 99001 SPECIMEN HANDLING PT-LAB: CPT

## 2019-01-24 PROCEDURE — 93005 ELECTROCARDIOGRAM TRACING: CPT

## 2019-01-24 PROCEDURE — 71046 X-RAY EXAM CHEST 2 VIEWS: CPT

## 2019-01-30 ENCOUNTER — HOSPITAL ENCOUNTER (OUTPATIENT)
Dept: LAB | Age: 54
Discharge: HOME | End: 2019-01-30

## 2019-01-30 PROCEDURE — 88305 TISSUE EXAM BY PATHOLOGIST: CPT

## 2019-05-17 PROBLEM — R87.612 LGSIL ON PAP SMEAR OF CERVIX: Status: ACTIVE | Noted: 2019-05-17

## 2019-05-17 PROBLEM — Z90.710 S/P HYSTERECTOMY WITH OOPHORECTOMY: Status: ACTIVE | Noted: 2019-05-17

## 2019-05-17 PROBLEM — Z90.721 S/P HYSTERECTOMY WITH OOPHORECTOMY: Status: ACTIVE | Noted: 2019-05-17

## 2019-05-18 PROBLEM — R87.612 LOW GRADE SQUAMOUS INTRAEPITHELIAL LESION (LGSIL) AT RISK FOR HIGH GRADE SQUAMOUS INTRAEPITHELIAL LESION (HGSIL) ON CYTOLOGIC SMEAR OF CERVIX: Status: ACTIVE | Noted: 2019-05-18

## 2020-01-18 ENCOUNTER — HOSPITAL ENCOUNTER (OUTPATIENT)
Dept: MAMMOGRAPHY | Age: 55
Discharge: HOME | End: 2020-01-18
Attending: OBSTETRICS & GYNECOLOGY
Payer: COMMERCIAL

## 2020-01-18 DIAGNOSIS — Z12.31 VISIT FOR SCREENING MAMMOGRAM: ICD-10-CM

## 2020-01-18 PROCEDURE — 77063 BREAST TOMOSYNTHESIS BI: CPT

## 2020-06-17 ENCOUNTER — HOSPITAL ENCOUNTER (OUTPATIENT)
Dept: LAB | Age: 55
Discharge: HOME | End: 2020-06-17

## 2020-06-17 PROCEDURE — 88305 TISSUE EXAM BY PATHOLOGIST: CPT

## 2020-06-17 PROCEDURE — 88341 IMHCHEM/IMCYTCHM EA ADD ANTB: CPT

## 2020-06-17 PROCEDURE — 88360 TUMOR IMMUNOHISTOCHEM/MANUAL: CPT

## 2020-06-17 PROCEDURE — 88342 IMHCHEM/IMCYTCHM 1ST ANTB: CPT

## 2020-07-29 PROBLEM — M48.00 SPINAL STENOSIS: Status: ACTIVE | Noted: 2020-07-29

## 2021-03-03 ENCOUNTER — TRANSCRIBE ORDER (OUTPATIENT)
Dept: SCHEDULING | Age: 56
End: 2021-03-03

## 2021-03-03 DIAGNOSIS — Z12.31 VISIT FOR SCREENING MAMMOGRAM: Primary | ICD-10-CM

## 2021-05-25 ENCOUNTER — HOSPITAL ENCOUNTER (OUTPATIENT)
Dept: MAMMOGRAPHY | Age: 56
Discharge: HOME | End: 2021-05-25
Attending: OBSTETRICS & GYNECOLOGY
Payer: COMMERCIAL

## 2021-05-25 DIAGNOSIS — Z12.31 VISIT FOR SCREENING MAMMOGRAM: ICD-10-CM

## 2021-05-25 PROCEDURE — 77063 BREAST TOMOSYNTHESIS BI: CPT

## 2022-03-18 PROBLEM — R87.612 LOW GRADE SQUAMOUS INTRAEPITHELIAL LESION (LGSIL) AT RISK FOR HIGH GRADE SQUAMOUS INTRAEPITHELIAL LESION (HGSIL) ON CYTOLOGIC SMEAR OF CERVIX: Status: ACTIVE | Noted: 2019-05-18

## 2022-03-19 PROBLEM — M48.00 SPINAL STENOSIS: Status: ACTIVE | Noted: 2020-07-29

## 2022-03-19 PROBLEM — R87.612 LGSIL ON PAP SMEAR OF CERVIX: Status: ACTIVE | Noted: 2019-05-17

## 2022-03-20 PROBLEM — Z90.710 S/P HYSTERECTOMY WITH OOPHORECTOMY: Status: ACTIVE | Noted: 2019-05-17

## 2022-03-20 PROBLEM — Z90.721 S/P HYSTERECTOMY WITH OOPHORECTOMY: Status: ACTIVE | Noted: 2019-05-17
